# Patient Record
Sex: FEMALE | Race: AMERICAN INDIAN OR ALASKA NATIVE
[De-identification: names, ages, dates, MRNs, and addresses within clinical notes are randomized per-mention and may not be internally consistent; named-entity substitution may affect disease eponyms.]

---

## 2022-01-18 ENCOUNTER — HOSPITAL ENCOUNTER (EMERGENCY)
Dept: HOSPITAL 5 - ED | Age: 52
LOS: 1 days | Discharge: LEFT BEFORE DISCHARGE | End: 2022-01-19
Payer: MEDICARE

## 2022-01-18 VITALS — SYSTOLIC BLOOD PRESSURE: 225 MMHG | DIASTOLIC BLOOD PRESSURE: 148 MMHG

## 2022-01-18 DIAGNOSIS — J18.8: ICD-10-CM

## 2022-01-18 DIAGNOSIS — I50.21: ICD-10-CM

## 2022-01-18 DIAGNOSIS — I16.0: Primary | ICD-10-CM

## 2022-01-18 DIAGNOSIS — Z20.822: ICD-10-CM

## 2022-01-18 PROCEDURE — 83880 ASSAY OF NATRIURETIC PEPTIDE: CPT

## 2022-01-18 PROCEDURE — 84484 ASSAY OF TROPONIN QUANT: CPT

## 2022-01-18 PROCEDURE — 71046 X-RAY EXAM CHEST 2 VIEWS: CPT

## 2022-01-18 PROCEDURE — 85025 COMPLETE CBC W/AUTO DIFF WBC: CPT

## 2022-01-18 PROCEDURE — 85007 BL SMEAR W/DIFF WBC COUNT: CPT

## 2022-01-18 PROCEDURE — 96374 THER/PROPH/DIAG INJ IV PUSH: CPT

## 2022-01-18 PROCEDURE — 80048 BASIC METABOLIC PNL TOTAL CA: CPT

## 2022-01-18 PROCEDURE — 87040 BLOOD CULTURE FOR BACTERIA: CPT

## 2022-01-18 PROCEDURE — 99291 CRITICAL CARE FIRST HOUR: CPT

## 2022-01-18 PROCEDURE — 80076 HEPATIC FUNCTION PANEL: CPT

## 2022-01-18 PROCEDURE — 36415 COLL VENOUS BLD VENIPUNCTURE: CPT

## 2022-01-18 NOTE — XRAY REPORT
CHEST 2 VIEWS 



INDICATION / CLINICAL INFORMATION: Weakness.



COMPARISON: None available.



FINDINGS:



SUPPORT DEVICES: None.

HEART / MEDIASTINUM: No significant abnormality. 

LUNGS / PLEURA: Patchy airspace opacities involving mid and lower lung zones bilaterally. No focal co
nsolidation. No significant effusion. No pneumothorax. 



ADDITIONAL FINDINGS: No significant additional findings.



IMPRESSION:

1.  Multilobar pneumonia involving the bilateral mid and lower lung zones.



Signer Name: Khanh Magdaleno MD 

Signed: 1/18/2022 10:23 PM

Workstation Name: [id number]

## 2022-01-18 NOTE — EMERGENCY DEPARTMENT REPORT
ED General Adult HPI





- General


Chief complaint: Weakness


Stated complaint: WEAKNESS


Time Seen by Provider: 01/18/22 21:49


Source: patient


Mode of arrival: Ambulatory


Limitations: No Limitations





- History of Present Illness


Initial comments: 





Patient is 51 years old female with history of hypertension and COPD.  Patient 

is not compliant with her medication.  Patient presented to the ER stating that 

she is having generalized weakness and she think that she have pneumonia.  

Patient stated that she has been coughing and having some shortness of breath wi

th fatigue.  She stated that she was told that her temperature was low.  Patient

stated that she is taking lisinopril 40 mg, amlodipine 10 mg and metoprolol 50 

mg but she does not remember the last time she took this medication because she 

ran out of it.  Patient denied any headache, neck pain, focal weakness numbness 

or tingling sensation.  No bowel or bladder incontinence.





- Related Data


                                    Allergies











Allergy/AdvReac Type Severity Reaction Status Date / Time


 


Sulfa (Sulfonamide Allergy  Hives Verified 01/18/22 17:25





Antibiotics)     














ED Review of Systems


ROS: 


Stated complaint: WEAKNESS


Other details as noted in HPI





Comment: All other systems reviewed and negative


Constitutional: denies: chills, fever


Respiratory: cough, shortness of breath.  denies: wheezing


Cardiovascular: palpitations.  denies: chest pain


Gastrointestinal: denies: abdominal pain, nausea, vomiting


Musculoskeletal: denies: back pain


Neurological: weakness.  denies: headache, numbness, paresthesias, confusion, 

abnormal gait





ED Physical Exam





- General


Limitations: No Limitations


General appearance: alert, in no apparent distress





- Head


Head exam: Present: atraumatic, normocephalic, normal inspection





- Eye


Eye exam: Present: normal appearance





- ENT


ENT exam: Present: normal exam, normal orophraynx, mucous membranes moist





- Neck


Neck exam: Present: normal inspection, full ROM.  Absent: tenderness, meningism

us





- Respiratory


Respiratory exam: Present: normal lung sounds bilaterally





- Cardiovascular


Cardiovascular Exam: Present: regular rate, normal rhythm, normal heart sounds





- GI/Abdominal


GI/Abdominal exam: Present: soft, normal bowel sounds.  Absent: distended, 

tenderness, guarding, rebound, rigid, organomegaly, mass, bruit, pulsatile mass,

hernia





- Extremities Exam


Extremities exam: Present: normal inspection, full ROM, normal capillary refill.

 Absent: tenderness





- Back Exam


Back exam: Present: normal inspection, full ROM.  Absent: CVA tenderness (R), 

CVA tenderness (L)





- Neurological Exam


Neurological exam: Present: alert, oriented X3, CN II-XII intact, normal gait, 

reflexes normal.  Absent: motor sensory deficit





- Psychiatric


Psychiatric exam: Present: normal mood





- Skin


Skin exam: Present: warm, intact, normal color





ED Course


                                   Vital Signs











  01/18/22 01/18/22 01/18/22





  17:23 17:27 23:05


 


Temperature  99.2 F 


 


Pulse Rate 104 H  104 H


 


Respiratory 18  





Rate   


 


Blood Pressure   225/148


 


Blood Pressure 232/154  





[Right]   


 


O2 Sat by Pulse 100  





Oximetry   














ED Medical Decision Making





- Lab Data


Result diagrams: 


                                 01/18/22 23:30





                                 01/18/22 23:30





- Radiology Data


Radiology results: report reviewed





- Medical Decision Making





Patient is 51 years old female with history of hypertension and COPD.  Patient 

is not compliant with her medication.  Patient presented to the ER stating that 

she is having generalized weakness and she think that she have pneumonia.  

Patient stated that she has been coughing and having some shortness of breath 

with fatigue.  She stated that she was told that her temperature was low.  

Patient stated that she is taking lisinopril 40 mg, amlodipine 10 mg and 

metoprolol 50 mg but she does not remember the last time she took this 

medication because she ran out of it.  Patient denied any headache, neck pain, 

focal weakness numbness or tingling sensation.  No bowel or bladder 

incontinence.





Chest x-ray showed bilateral infiltrates.  Patient received labetalol IV with no

 improvement in her blood pressure.  I started patient on Cardene drip.


Patient BNP is more than 2000.  Patient stated that she never been diagnosed 

with congestive heart failure before.  Patient given Lasix 60 mg IV.  I 

discussed the patient with Dr. Baugh, he agreed to admit the patient to medical

 service for further management.








Patient stated that she does not want to be admitted to the hospital.  Patient 

refused to give any reason.  Patient is alert, oriented x3 and able to make 

sound decision.  Patient signed AGAINST MEDICAL ADVICE.  Patient strongly 

advised to come back to the ER or go to another ER as soon as possible.  Patient

 informed that her condition is critical and required to be admitted to the 

intensive care unit and leaving AGAINST MEDICAL ADVICE can cause permanent 

disability and even death.  Patient stated that she understood the instruction 

very well and she will follow-up with her primary care physician and she will 

come to the ER if her symptoms get worse.


Critical Care Time: Yes


Critical care time in (mins) excluding proc time.: 35


Critical care attestation.: 


If time is entered above; I have spent that time in minutes in the direct care 

of this critically ill patient, excluding procedure time.








ED Disposition


Clinical Impression: 


 Hypertensive emergency, New onset of congestive heart failure, Bilateral 

pneumonia, Suspected COVID-19 virus infection





Disposition: 07 LEFT AGAINST MEDICAL ADVICE


Is pt being admited?: No


Condition: Stable


Instructions:  Bacterial Pneumonia (ED), Hypertension (ED)


Referrals: 


PRIMARY CARE,MD [Primary Care Provider] - 3-5 Days


Forms:  AMA Form, Work/School Release Form(ED)

## 2022-01-19 LAB
ALBUMIN SERPL-MCNC: 3.5 G/DL (ref 3.9–5)
ALT SERPL-CCNC: 79 UNITS/L (ref 7–56)
BAND NEUTROPHILS # (MANUAL): 0 K/MM3
BILIRUB DIRECT SERPL-MCNC: < 0.2 MG/DL (ref 0–0.2)
BUN SERPL-MCNC: 20 MG/DL (ref 7–17)
BUN/CREAT SERPL: 22 %
CALCIUM SERPL-MCNC: 8.1 MG/DL (ref 8.4–10.2)
HCT VFR BLD CALC: 36.2 % (ref 30.3–42.9)
HEMOLYSIS INDEX: 3
HGB BLD-MCNC: 11.8 GM/DL (ref 10.1–14.3)
MCHC RBC AUTO-ENTMCNC: 33 % (ref 30–34)
MCV RBC AUTO: 93 FL (ref 79–97)
MYELOCYTES # (MANUAL): 0 K/MM3
OVALOCYTES BLD QL SMEAR: (no result)
PLATELET # BLD: 225 K/MM3 (ref 140–440)
PROMYELOCYTES # (MANUAL): 0 K/MM3
RBC # BLD AUTO: 3.88 M/MM3 (ref 3.65–5.03)
TOTAL CELLS COUNTED BLD: 100

## 2022-02-11 ENCOUNTER — HOSPITAL ENCOUNTER (EMERGENCY)
Dept: HOSPITAL 5 - ED | Age: 52
Discharge: LEFT BEFORE DISCHARGE | End: 2022-02-11
Payer: MEDICARE

## 2022-02-11 VITALS — SYSTOLIC BLOOD PRESSURE: 208 MMHG | DIASTOLIC BLOOD PRESSURE: 132 MMHG

## 2022-02-11 DIAGNOSIS — Z79.899: ICD-10-CM

## 2022-02-11 DIAGNOSIS — I16.0: ICD-10-CM

## 2022-02-11 DIAGNOSIS — R22.43: Primary | ICD-10-CM

## 2022-02-11 DIAGNOSIS — Z53.21: ICD-10-CM

## 2022-02-11 DIAGNOSIS — I50.9: Primary | ICD-10-CM

## 2022-02-11 LAB
ALBUMIN SERPL-MCNC: 3.2 G/DL (ref 3.9–5)
ALT SERPL-CCNC: 58 UNITS/L (ref 7–56)
BACTERIA #/AREA URNS HPF: (no result) /HPF
BAND NEUTROPHILS # (MANUAL): 0 K/MM3
BILIRUB UR QL STRIP: (no result)
BLOOD UR QL VISUAL: (no result)
BUN SERPL-MCNC: 12 MG/DL (ref 7–17)
BUN/CREAT SERPL: 10 %
CALCIUM SERPL-MCNC: 8.1 MG/DL (ref 8.4–10.2)
HCT VFR BLD CALC: 36.2 % (ref 30.3–42.9)
HEMOLYSIS INDEX: 12
HGB BLD-MCNC: 11.9 GM/DL (ref 10.1–14.3)
HYALINE CASTS #/AREA URNS LPF: 1 /LPF
INR PPP: 0.98 (ref 0.87–1.13)
MCHC RBC AUTO-ENTMCNC: 33 % (ref 30–34)
MCV RBC AUTO: 92 FL (ref 79–97)
MUCOUS THREADS #/AREA URNS HPF: (no result) /HPF
MYELOCYTES # (MANUAL): 0 K/MM3
PH UR STRIP: 5 [PH] (ref 5–7)
PLATELET # BLD: 245 K/MM3 (ref 140–440)
PROMYELOCYTES # (MANUAL): 0 K/MM3
PROT UR STRIP-MCNC: >500 MG/DL
RBC # BLD AUTO: 3.92 M/MM3 (ref 3.65–5.03)
RBC #/AREA URNS HPF: 23 /HPF (ref 0–6)
TOTAL CELLS COUNTED BLD: 100
UROBILINOGEN UR-MCNC: < 2 MG/DL (ref ?–2)
WBC #/AREA URNS HPF: 16 /HPF (ref 0–6)

## 2022-02-11 PROCEDURE — 93010 ELECTROCARDIOGRAM REPORT: CPT

## 2022-02-11 PROCEDURE — 85025 COMPLETE CBC W/AUTO DIFF WBC: CPT

## 2022-02-11 PROCEDURE — 93005 ELECTROCARDIOGRAM TRACING: CPT

## 2022-02-11 PROCEDURE — 81001 URINALYSIS AUTO W/SCOPE: CPT

## 2022-02-11 PROCEDURE — 83880 ASSAY OF NATRIURETIC PEPTIDE: CPT

## 2022-02-11 PROCEDURE — 87086 URINE CULTURE/COLONY COUNT: CPT

## 2022-02-11 PROCEDURE — 85610 PROTHROMBIN TIME: CPT

## 2022-02-11 PROCEDURE — 83735 ASSAY OF MAGNESIUM: CPT

## 2022-02-11 PROCEDURE — 36415 COLL VENOUS BLD VENIPUNCTURE: CPT

## 2022-02-11 PROCEDURE — 80053 COMPREHEN METABOLIC PANEL: CPT

## 2022-02-11 PROCEDURE — 99284 EMERGENCY DEPT VISIT MOD MDM: CPT

## 2022-02-11 PROCEDURE — 85007 BL SMEAR W/DIFF WBC COUNT: CPT

## 2022-02-11 PROCEDURE — 84484 ASSAY OF TROPONIN QUANT: CPT

## 2022-02-11 PROCEDURE — 82550 ASSAY OF CK (CPK): CPT

## 2022-02-11 PROCEDURE — 71046 X-RAY EXAM CHEST 2 VIEWS: CPT

## 2022-02-11 NOTE — XRAY REPORT
CHEST 2 VIEWS 



INDICATION:  Dyspnea.



COMPARISON:  1/18/2022



FINDINGS:

Support devices: None.



Heart: There is borderline to mild cardiomegaly. 

Lungs/pleura: Mild central pulmonary venous congestion is evident. Small right pleural effusion.  No 
pneumothorax.



Additional findings: None.



IMPRESSION:

Mild CHF



Signer Name: Kwesi Curtis Jr, MD 

Signed: 2/11/2022 3:11 PM

Workstation Name: [id number]

## 2022-02-11 NOTE — EMERGENCY DEPARTMENT REPORT
ED General Adult HPI





- General


Chief complaint: Extremity Problem,Nontraumatic


Stated complaint: BI LEG SWELLING


PUI?: No


Time Seen by Provider: 02/11/22 15:28


Source: patient, RN notes reviewed, old records reviewed


Mode of arrival: Ambulatory


Limitations: No Limitations





- History of Present Illness


Initial comments: 





The patient was evaluated in the emergency department for symptoms described in 

the history of present illness.  He/she was evaluated in the context of the 

global COVID-19 pandemic, which necessitated consideration that the patient 

might be at risk for infection with the virus that causes COVID-19.  Instituti

onal protocols and algorithms that pertain to the evaluation of patients at risk

for COVID-19 are in a state of rapid change based on information released by 

regulatory bodies including the CDC and federal and state organizations.  These 

policies and algorithms were followed during the patient's care in the emergency

department.  Please note that these policies, procedures and recommendations 

changed on a rapid basis.





During the history and physical examination I am chaperoned by phlebotomist Mitra Patel





The patient is a 52-year-old female with a presumed history of congestive heart 

failure, and hypertension, who was evaluated in this hospital last week, found 

to have evidence of hypertensive urgency, congestive heart failure and pulmonary

infiltrates, who signed out AGAINST MEDICAL ADVICE in spite of reasonable 

medical recommendations.





Today, the patient presents to the ER with a persistent complaints of lower 

extremity swelling, unintentional weight gain, and shortness of breath.  She 

denies travel, surgery, immobilization, DVT/PE risk factors.  She is not COVID-

19 vaccinated


-: Gradual, days(s)


Location: left, right, lower extremity


Consistency: constant


Improves with: rest


Worsens with: movement





- Related Data


                                  Previous Rx's











 Medication  Instructions  Recorded  Last Taken  Type


 


Aspirin 325 mg PO ONCE #30 tablet 02/11/22 Unknown Rx


 


Furosemide [Lasix] 20 mg PO QDAY #30 tablet 02/11/22 Unknown Rx


 


Lisinopril [Zestril] 5 mg PO QDAY #30 tab 02/11/22 Unknown Rx











                                    Allergies











Allergy/AdvReac Type Severity Reaction Status Date / Time


 


Sulfa (Sulfonamide Allergy  Hives Verified 01/18/22 17:25





Antibiotics)     














ED Review of Systems


ROS: 


Stated complaint: BI LEG SWELLING


Other details as noted in HPI





Constitutional: denies: fever


Eyes: denies: eye discharge


ENT: denies: congestion


Respiratory: orthopnea, shortness of breath, SOB with exertion, SOB at rest, 

wheezing


Cardiovascular: chest pain, dyspnea on exertion, orthopnea, edema, syncope


Gastrointestinal: denies: abdominal pain, nausea, vomiting, hematemesis, melena,

 hematochezia


Genitourinary: denies: dysuria


Musculoskeletal: arthralgia, myalgia


Neurological: weakness


Hematological/Lymphatic: denies: easy bleeding





ED Past Medical Hx





- Past Medical History


Previous Medical History?: No





- Surgical History


Past Surgical History?: No





- Medications


Home Medications: 


                                Home Medications











 Medication  Instructions  Recorded  Confirmed  Last Taken  Type


 


Aspirin 325 mg PO ONCE #30 tablet 02/11/22  Unknown Rx


 


Furosemide [Lasix] 20 mg PO QDAY #30 tablet 02/11/22  Unknown Rx


 


Lisinopril [Zestril] 5 mg PO QDAY #30 tab 02/11/22  Unknown Rx














ED Physical Exam





- General


Limitations: No Limitations


General appearance: alert, obese





- Head


Head exam: Present: atraumatic, normocephalic





- Eye


Eye exam: Present: normal appearance, EOMI.  Absent: nystagmus





- ENT


ENT exam: Present: normal exam, normal orophraynx, mucous membranes moist, 

normal external ear exam





- Neck


Neck exam: Present: normal inspection, full ROM.  Absent: tenderness, 

meningismus





- Respiratory


Respiratory exam: Present: rales.  Absent: respiratory distress, rhonchi, 

stridor





- Cardiovascular


Cardiovascular Exam: Present: tachycardia, normal heart sounds, JVD.  Absent: 

bradycardia, irregular rhythm, systolic murmur, diastolic murmur, rubs, gallop





- GI/Abdominal


GI/Abdominal exam: Present: soft.  Absent: distended, tenderness, guarding, 

rebound, rigid, pulsatile mass





- Extremities Exam


Extremities exam: Present: normal inspection, full ROM, pedal edema (3+ edema 

bilateral lower extremity), other (2+ pulses noted in the bilateral upper and 

lower extremities.  There is no palpable cord.   negative Homans sign.  Muscular

 compartments are soft.  The pelvis is stable.).  Absent: calf tenderness





- Back Exam


Back exam: Present: normal inspection, full ROM.  Absent: tenderness, CVA 

tenderness (R), CVA tenderness (L), paraspinal tenderness, vertebral tenderness





- Neurological Exam


Neurological exam: Present: alert, oriented X3, normal gait, other (No facial 

droop.  Tongue midline.  Extraocular movements intact bilaterally.  Facial 

sensation intact to light touch in V1, V2, V3 distribution bilaterally.  5 and a

 5 strength in 4 extremities.  Sensation intact to light touch in 4 

extremities.).  Absent: motor sensory deficit





- Psychiatric


Psychiatric exam: Present: normal affect, normal mood





- Skin


Skin exam: Present: warm, dry, intact, normal color.  Absent: rash





ED Course


                                   Vital Signs











  02/11/22





  14:01


 


Temperature 98.8 F


 


Pulse Rate 114 H


 


Respiratory 16





Rate 


 


Blood Pressure 208/132





[Left] 


 


O2 Sat by Pulse 99





Oximetry 














ED Medical Decision Making





- Lab Data


Result diagrams: 


                                 02/11/22 13:50





                                 02/11/22 15:39








                                   Vital Signs











  02/11/22





  14:01


 


Temperature 98.8 F


 


Pulse Rate 114 H


 


Respiratory 16





Rate 


 


Blood Pressure 208/132





[Left] 


 


O2 Sat by Pulse 99





Oximetry 











                                   Lab Results











  02/11/22 02/11/22 02/11/22 Range/Units





  13:50 13:50 15:39 


 


WBC  4.7    (4.5-11.0)  K/mm3


 


RBC  3.92    (3.65-5.03)  M/mm3


 


Hgb  11.9    (10.1-14.3)  gm/dl


 


Hct  36.2    (30.3-42.9)  %


 


MCV  92    (79-97)  fl


 


MCH  30    (28-32)  pg


 


MCHC  33    (30-34)  %


 


RDW  15.5 H    (13.2-15.2)  %


 


Plt Count  245    (140-440)  K/mm3


 


Eos % (Auto)  Np    


 


PT   14.1   (12.2-14.9)  Sec.


 


INR   0.98   (0.87-1.13)  


 


Sodium    136 L  (137-145)  mmol/L


 


Potassium    4.1  (3.6-5.0)  mmol/L


 


Chloride    103.4  ()  mmol/L


 


Carbon Dioxide    20 L  (22-30)  mmol/L


 


Anion Gap    17  mmol/L


 


BUN    12  (7-17)  mg/dL


 


Creatinine    1.2  (0.6-1.2)  mg/dL


 


Estimated GFR    57  ml/min


 


BUN/Creatinine Ratio    10  %


 


Glucose    99  ()  mg/dL


 


Calcium    8.1 L  (8.4-10.2)  mg/dL


 


Magnesium    1.90  (1.7-2.3)  mg/dL


 


Total Bilirubin    0.60  (0.1-1.2)  mg/dL


 


AST    51 H  (5-40)  units/L


 


ALT    58 H  (7-56)  units/L


 


Alkaline Phosphatase    48  ()  units/L


 


Total Creatine Kinase     ()  units/L


 


Troponin T     (0.00-0.029)  ng/mL


 


Total Protein    7.3  (6.3-8.2)  g/dL


 


Albumin    3.2 L  (3.9-5)  g/dL


 


Albumin/Globulin Ratio    0.8  %


 


Urine Color     (Yellow)  


 


Urine Turbidity     (Clear)  


 


Urine pH     (5.0-7.0)  


 


Ur Specific Gravity     (1.003-1.030)  


 


Urine Protein     (Negative)  mg/dL


 


Urine Glucose (UA)     (Negative)  mg/dL


 


Urine Ketones     (Negative)  mg/dL


 


Urine Blood     (Negative)  


 


Urine Nitrite     (Negative)  


 


Urine Bilirubin     (Negative)  


 


Urine Urobilinogen     (<2.0)  mg/dL


 


Ur Leukocyte Esterase     (Negative)  


 


Urine WBC (Auto)     (0.0-6.0)  /HPF


 


Urine RBC (Auto)     (0.0-6.0)  /HPF


 


U Epithel Cells (Auto)     (0-13.0)  /HPF


 


Urine Bacteria (Auto)     (Negative)  /HPF


 


Hyaline Casts     /LPF


 


Urine Mucus     /HPF


 


Ur Yeast w Hyphae     /HPF














  02/11/22 02/11/22 02/11/22 Range/Units





  15:39 15:39 Unknown 


 


WBC     (4.5-11.0)  K/mm3


 


RBC     (3.65-5.03)  M/mm3


 


Hgb     (10.1-14.3)  gm/dl


 


Hct     (30.3-42.9)  %


 


MCV     (79-97)  fl


 


MCH     (28-32)  pg


 


MCHC     (30-34)  %


 


RDW     (13.2-15.2)  %


 


Plt Count     (140-440)  K/mm3


 


Eos % (Auto)     


 


PT     (12.2-14.9)  Sec.


 


INR     (0.87-1.13)  


 


Sodium     (137-145)  mmol/L


 


Potassium     (3.6-5.0)  mmol/L


 


Chloride     ()  mmol/L


 


Carbon Dioxide     (22-30)  mmol/L


 


Anion Gap     mmol/L


 


BUN     (7-17)  mg/dL


 


Creatinine     (0.6-1.2)  mg/dL


 


Estimated GFR     ml/min


 


BUN/Creatinine Ratio     %


 


Glucose     ()  mg/dL


 


Calcium     (8.4-10.2)  mg/dL


 


Magnesium     (1.7-2.3)  mg/dL


 


Total Bilirubin     (0.1-1.2)  mg/dL


 


AST     (5-40)  units/L


 


ALT     (7-56)  units/L


 


Alkaline Phosphatase     ()  units/L


 


Total Creatine Kinase   91   ()  units/L


 


Troponin T  0.012    (0.00-0.029)  ng/mL


 


Total Protein     (6.3-8.2)  g/dL


 


Albumin     (3.9-5)  g/dL


 


Albumin/Globulin Ratio     %


 


Urine Color    Yellow  (Yellow)  


 


Urine Turbidity    Slightly-cloudy  (Clear)  


 


Urine pH    5.0  (5.0-7.0)  


 


Ur Specific Gravity    1.022  (1.003-1.030)  


 


Urine Protein    >500  (Negative)  mg/dL


 


Urine Glucose (UA)    Neg  (Negative)  mg/dL


 


Urine Ketones    Tr  (Negative)  mg/dL


 


Urine Blood    Neg  (Negative)  


 


Urine Nitrite    Neg  (Negative)  


 


Urine Bilirubin    Neg  (Negative)  


 


Urine Urobilinogen    < 2.0  (<2.0)  mg/dL


 


Ur Leukocyte Esterase    Tr  (Negative)  


 


Urine WBC (Auto)    16.0 H  (0.0-6.0)  /HPF


 


Urine RBC (Auto)    23.0  (0.0-6.0)  /HPF


 


U Epithel Cells (Auto)    35.0 H  (0-13.0)  /HPF


 


Urine Bacteria (Auto)    2+  (Negative)  /HPF


 


Hyaline Casts    1  /LPF


 


Urine Mucus    Few  /HPF


 


Ur Yeast w Hyphae    Few  /HPF














- EKG Data


-: EKG Interpreted by Me


EKG shows normal: sinus rhythm


Rate: tachycardia





- Radiology Data


Radiology results: pending, report reviewed, image reviewed





CHEST 2 VIEWS  INDICATION: Dyspnea.  COMPARISON: 1/18/2022  FINDINGS: Support 

devices: None.  Heart: There is borderline to mild cardiomegaly. Lungs/pleura: 

Mild central pulmonary venous congestion is evident. Small right pleural 

effusion. No pneumothorax.  Additional findings: None.  IMPRESSION: Mild CHF  

Signer Name: Kwesi Curtis Jr, MD Signed: 2/11/2022 2:11 PM








CHEST 2 VIEWS  INDICATION / CLINICAL INFORMATION: Weakness.  COMPARISON: None 

available.  FINDINGS:  SUPPORT DEVICES: None. HEART / MEDIASTINUM: No 

significant abnormality. LUNGS / PLEURA: Patchy airspace opacities involving mid

 and lower lung zones bilaterally. No focal consolidation. No significant 

effusion. No pneumothorax.  ADDITIONAL FINDINGS: No significant additional 

findings.  IMPRESSION: 1. Multilobar pneumonia involving the bilateral mid and 

lower lung zones.  Signer Name: Khanh Magdaleno MD Signed: 1/18/2022 9:23 PM 

Workstation Name: [id number]





- Medical Decision Making





Differential diagnosis, including but not limited to: Congestive heart failure, 

volume overload, cardiorenal syndrome, hypertensive urgency/emergency





Assessment and plan: 52-year-old female with probable decompensated congestive 

heart failure, manifest by JVD, rales, lower extremity edema and elevated blood 

pressure.  Patient presents as awake, alert, oriented, clinically sober and of 

sound mind at this time.  She exhibits decision-making capacity.  She is free 

from distracting injury.  With witness present, Ms. Mitra ReaJorge, I advised 

the patient that we recommended admission to the medical service for medical 

optimization for cardiac risk ratification, and management for acutely 

decompensated congestive heart failure.  The risks of declining admission were 

discussed with the patient, including death, disability, paralysis, and 

permanent loss of quality of life.  Patient presents as awake, alert, oriented 

of sound mind, and free from distracting injury, and articulates decision-making

 capacity.  Initially during my initial discussion with her, she indicated that 

she was agreeable to admission.  However, she subsequently left AGAINST MEDICAL 

ADVICE.  She left without receiving her discharge paperwork


Critical care attestation.: 


If time is entered above; I have spent that time in minutes in the direct care 

of this critically ill patient, excluding procedure time.








ED Disposition


Clinical Impression: 


 Acute congestive heart failure, Hypertensive urgency





Disposition: 07 LEFT AGAINST MEDICAL ADVICE


Is pt being admited?: No


Does the pt Need Aspirin: No


Condition: Undetermined


Additional Instructions: 


you have left the hospital/emergency room AGAINST MEDICAL ADVICE.  By leaving, 

you risked death, disability, paralysis, permanent loss of quality of life.  The

 ER is open 24 hours a day, 7 days a week.  It never closes.  Please return to 

the emergency room right away if and when you change your mind.  If you decide 

not to return to the emergency room, please follow-up with the listed physician 

referrals as soon as possible.


Referrals: 


[facility] [Provider Group] - [facility] [Provider Group] - ASAP

## 2022-02-11 NOTE — EVENT NOTE
ED Screening Note


ED Screening Note: 


52-year-old female presents to the ED complaining of bilateral lower leg 

extremity edema and shortness of breath.  Patient stated that she was here 

previously on January 28 and left AMA after being diagnosed with hypertensive 

emergency ,new onset of CHF and bilateral pneumonia and suspected COVID-19 

infection.  Patient states that she has not seek any medical treatment since 

leaving the hospital and condition has worsened.  Patient states that she is 

homeless currently living in a car and unable to be evaluated by a primary care 

doctor.  She state no prior treatment to arrival.





This initial assessment/diagnostic orders/clinical plan/treatment(s) is/are 

subject to change based on patients health status, clinical progression and re-

assessment by fellow clinical providers in the ED. Further treatment and workup 

at subsequent clinical providers discretion. Patient/guardian urged not to elope

from the ED as their condition may be serious if not clinically assessed and 

managed. 





Initial orders include: Labs, EKG, and chest x-ray

## 2022-02-12 ENCOUNTER — HOSPITAL ENCOUNTER (EMERGENCY)
Dept: HOSPITAL 5 - ED | Age: 52
Discharge: HOME | End: 2022-02-12
Payer: MEDICARE

## 2022-02-12 VITALS — SYSTOLIC BLOOD PRESSURE: 191 MMHG | DIASTOLIC BLOOD PRESSURE: 137 MMHG

## 2022-02-12 DIAGNOSIS — I10: ICD-10-CM

## 2022-02-12 DIAGNOSIS — I11.0: Primary | ICD-10-CM

## 2022-02-12 DIAGNOSIS — I50.9: ICD-10-CM

## 2022-02-12 DIAGNOSIS — Z88.2: ICD-10-CM

## 2022-02-12 DIAGNOSIS — I16.0: ICD-10-CM

## 2022-02-12 DIAGNOSIS — Z98.890: ICD-10-CM

## 2022-02-12 DIAGNOSIS — J44.9: ICD-10-CM

## 2022-02-12 PROCEDURE — 99282 EMERGENCY DEPT VISIT SF MDM: CPT

## 2022-02-12 NOTE — EMERGENCY DEPARTMENT REPORT
ED Chest Pain HPI





- General


Chief Complaint: Chest Pain


Stated Complaint: POSS PNEUMONIA


Time Seen by Provider: 02/12/22 07:04


Source: patient


Mode of arrival: Ambulatory


Limitations: No Limitations





- History of Present Illness


Initial Comments: 





Chief complaint: I came back to get my results.  She made me check back in.





HPI: This is a 52-year-old female with history of hypertension and COPD who 

presents with left-sided chest pain.  She was evaluated by my colleague on 

yesterday afternoon.  She eloped.  She stated that she was hungry.  She went to 

her car to sleep.  She does not want any further work-up.  She states that she 

just wants to know her results.  According to electronic medical record patient 

left AGAINST MEDICAL ADVICE after my colleague recommended admission for acutely

decompensated congestive heart failure.


MD Complaint: chest pain


-: days(s) (2 days)


Onset: during rest


Pain Location: left chest


Severity: mild


Quality: dull


Consistency: constant


Improves With: nothing


Worsens With: nothing





- Related Data


                                  Previous Rx's











 Medication  Instructions  Recorded  Last Taken  Type


 


Aspirin 325 mg PO ONCE #30 tablet 02/11/22 Unknown Rx


 


Furosemide [Lasix] 20 mg PO QDAY #30 tablet 02/11/22 Unknown Rx


 


Lisinopril [Zestril] 5 mg PO QDAY #30 tab 02/11/22 Unknown Rx


 


Furosemide [Lasix TAB] 20 mg PO QDAY 14 Days #14 tablet 02/12/22 Unknown Rx











                                    Allergies











Allergy/AdvReac Type Severity Reaction Status Date / Time


 


Sulfa (Sulfonamide Allergy  Hives Verified 01/18/22 17:25





Antibiotics)     














Heart Score





- HEART Score


History: Slightly suspicious


EKG: Non-specific


Age: 45-65


Risk factors: 1-2 risk factors


Troponin: < normal limit


HEART Score: 3





- EKG Read Time


Time EKG Completed: 00:00


EKG Read Time: 00:00





- Critical Actions


Critical Actions: 0-3 pts:0.9-1.7%risk of adverse cardiac event.Candidate for 

discharge





ED Review of Systems


ROS: 


Stated complaint: POSS PNEUMONIA


Other details as noted in HPI





Comment: All other systems reviewed and negative


Constitutional: denies: chills


Respiratory: denies: shortness of breath


Cardiovascular: chest pain


Gastrointestinal: denies: abdominal pain, nausea, vomiting





ED Past Medical Hx





- Past Medical History


Previous Medical History?: Yes


Hx Hypertension: Yes


Hx COPD: Yes





- Surgical History


Past Surgical History?: Yes


Additional Surgical History: left eye





- Family History


Family history: hypertension, other (Aunt has history of cardiac disease)





- Social History


Smoking Status: Never Smoker


Substance Use Type: None





- Medications


Home Medications: 


                                Home Medications











 Medication  Instructions  Recorded  Confirmed  Last Taken  Type


 


Aspirin 325 mg PO ONCE #30 tablet 02/11/22  Unknown Rx


 


Furosemide [Lasix] 20 mg PO QDAY #30 tablet 02/11/22  Unknown Rx


 


Lisinopril [Zestril] 5 mg PO QDAY #30 tab 02/11/22  Unknown Rx


 


Furosemide [Lasix TAB] 20 mg PO QDAY 14 Days #14 tablet 02/12/22  Unknown Rx














ED Physical Exam





- General


Limitations: No Limitations


General appearance: alert, in no apparent distress, other (No acute distress 

talkative appears comfortable)





- Head


Head exam: Present: atraumatic, normocephalic





- Eye


Eye exam: Present: normal appearance





- ENT


ENT exam: Present: mucous membranes moist





- Neck


Neck exam: Present: normal inspection





- Respiratory


Respiratory exam: Present: normal lung sounds bilaterally.  Absent: respiratory 

distress, wheezes, rales, rhonchi





- Cardiovascular


Cardiovascular Exam: Present: regular rate, normal rhythm, normal heart sounds. 

Absent: systolic murmur, diastolic murmur, rubs, gallop





- GI/Abdominal


GI/Abdominal exam: Present: soft, normal bowel sounds





- Extremities Exam


Extremities exam: Present: normal inspection





- Back Exam


Back exam: Present: normal inspection





- Neurological Exam


Neurological exam: Present: alert, oriented X3





- Psychiatric


Psychiatric exam: Present: normal affect, normal mood





- Skin


Skin exam: Present: warm, dry, intact, normal color.  Absent: rash





ED Course


                                   Vital Signs











  02/12/22





  06:43


 


Temperature 98.5 F


 


Pulse Rate 106 H


 


Respiratory 20





Rate 


 


Blood Pressure 191/137


 


O2 Sat by Pulse 99





Oximetry 














ED Medical Decision Making





- Radiology Data


Radiology results: report reviewed





I reviewed radiology impression from chest radiograph obtained on yesterday 

2/11/2022





- Medical Decision Making





1.  Chest pain heart score 3, I reviewed troponin value yesterday within normal 

limits.  Atypical for ACS.  Patient refused repeat EKG on today.





2.  Congestive heart failure: New diagnosis for patient, BNP elevated on 2 

occasions in January and in February.  Work-up yesterday also revealed mild 

transaminitis.  Yesterday's chest radiograph revealed mild cardiomegaly with 

mild central pulmonary venous congestion.  Mild right pleural effusion.





Patient understands that "I have fluid on my heart."  She does not desire to be 

admitted.  She understands that she will need to follow-up with a new primary 

care physician and cardiologist.  She recently moved from [facility].  I have

faxed outpatient cardiology referral form to [facility] to expedite 

close follow-up.  I have referred her to an internal medicine physician.  I have

prescribed 2 week supply of furosemide 20 mg tablet.


Critical care attestation.: 


If time is entered above; I have spent that time in minutes in the direct care 

of this critically ill patient, excluding procedure time.








ED Disposition


Clinical Impression: 


 Acute congestive heart failure, Hypertensive urgency





Disposition: 01 HOME / SELF CARE / HOMELESS


Is pt being admited?: No


Does the pt Need Aspirin: No


Condition: Stable


Instructions:  Heart Failure, Self Care, Easy-to-Read, Heart Failure, Diagnosis,

Easy-to-Read


Prescriptions: 


Furosemide [Lasix TAB] 20 mg PO QDAY 14 Days #14 tablet


Referrals: 


ALEKSANDER WRIGHT MD [Staff Physician] - 3-5 Days


YOGI ROSE MD [Staff Physician] - 3-5 Days

## 2022-02-12 NOTE — ELECTROCARDIOGRAPH REPORT
[facility]

                                       

Test Date:    2022               Test Time:    15:02:11

Pat Name:     ED YARBROUGH           Department:   

Patient ID:   [id number]          Room:          

Gender:       F                        Technician:   CHRISSY

:          1970               Requested By: TIKI DALTON

Order Number: [id number]              Reading MD:   Cooper Barahona

                                 Measurements

Intervals                              Axis          

Rate:         108                      P:            72

RI:           150                      QRS:          63

QRSD:         87                       T:            -8

QT:           344                                    

QTc:          463                                    

                           Interpretive Statements

Sinus tachycardia

Left atrial enlargement

Left ventricular hypertrophy

No previous ECG available for comparison

Electronically Signed On 2022 11:00:18 EST by Cooper Barahona